# Patient Record
Sex: MALE | Race: WHITE | Employment: FULL TIME | URBAN - METROPOLITAN AREA
[De-identification: names, ages, dates, MRNs, and addresses within clinical notes are randomized per-mention and may not be internally consistent; named-entity substitution may affect disease eponyms.]

---

## 2017-01-06 ENCOUNTER — HOSPITAL ENCOUNTER (OUTPATIENT)
Dept: NUCLEAR MEDICINE | Facility: HOSPITAL | Age: 32
Discharge: HOME | End: 2017-01-06
Attending: INTERNAL MEDICINE
Payer: COMMERCIAL

## 2017-01-06 DIAGNOSIS — R12 HEARTBURN: ICD-10-CM

## 2017-01-06 DIAGNOSIS — R10.11 RUQ ABDOMINAL PAIN: ICD-10-CM

## 2017-01-06 PROCEDURE — 78264 GASTRIC EMPTYING IMG STUDY: CPT

## 2017-01-09 ENCOUNTER — HOSPITAL ENCOUNTER (OUTPATIENT)
Age: 32
Discharge: HOME | End: 2017-01-09
Attending: FAMILY MEDICINE
Payer: COMMERCIAL

## 2017-01-09 VITALS
WEIGHT: 140 LBS | SYSTOLIC BLOOD PRESSURE: 128 MMHG | DIASTOLIC BLOOD PRESSURE: 87 MMHG | RESPIRATION RATE: 18 BRPM | HEART RATE: 87 BPM | TEMPERATURE: 98 F | BODY MASS INDEX: 19.6 KG/M2 | HEIGHT: 71 IN | OXYGEN SATURATION: 98 %

## 2017-01-09 DIAGNOSIS — S39.012A LUMBAR STRAIN, INITIAL ENCOUNTER: Primary | ICD-10-CM

## 2017-01-09 LAB
POCT BILIRUBIN URINE: NEGATIVE
POCT BLOOD URINE: NEGATIVE
POCT GLUCOSE URINE: NEGATIVE MG/DL
POCT KETONE URINE: NEGATIVE MG/DL
POCT LEUKOCYTE ESTERASE URINE: NEGATIVE
POCT NITRITE URINE: NEGATIVE
POCT PH URINE: 6 (ref 5–8)
POCT PROTEIN URINE: NEGATIVE MG/DL
POCT SPECIFIC GRAVITY URINE: 1.02
POCT URINE CLARITY: CLEAR
POCT URINE COLOR: YELLOW
POCT UROBILINOGEN URINE: 0.2 MG/DL

## 2017-01-09 PROCEDURE — 99204 OFFICE O/P NEW MOD 45 MIN: CPT

## 2017-01-09 PROCEDURE — 99214 OFFICE O/P EST MOD 30 MIN: CPT

## 2017-01-09 PROCEDURE — 96372 THER/PROPH/DIAG INJ SC/IM: CPT

## 2017-01-09 PROCEDURE — 81002 URINALYSIS NONAUTO W/O SCOPE: CPT | Performed by: FAMILY MEDICINE

## 2017-01-09 RX ORDER — KETOROLAC TROMETHAMINE 30 MG/ML
60 INJECTION, SOLUTION INTRAMUSCULAR; INTRAVENOUS ONCE
Status: COMPLETED | OUTPATIENT
Start: 2017-01-09 | End: 2017-01-09

## 2017-01-09 RX ORDER — DICLOFENAC SODIUM 75 MG/1
75 TABLET, DELAYED RELEASE ORAL 2 TIMES DAILY
Qty: 20 TABLET | Refills: 0 | Status: SHIPPED | OUTPATIENT
Start: 2017-01-09 | End: 2017-03-17 | Stop reason: ALTCHOICE

## 2017-01-09 RX ORDER — CYCLOBENZAPRINE HCL 10 MG
10 TABLET ORAL NIGHTLY PRN
Qty: 10 TABLET | Refills: 0 | Status: SHIPPED | OUTPATIENT
Start: 2017-01-09 | End: 2017-01-19

## 2017-01-09 NOTE — ED PROVIDER NOTES
Patient Seen in: [facility]    History   Patient presents with:  Back Pain    Stated Complaint: BACK PAIN    HPI    This 29-year-old male presents the office with low back pain last 3 days.   He states it started after he was hel complaint: BACK PAIN  Other systems are as noted in HPI. Constitutional and vital signs reviewed. All other systems reviewed and negative except as noted above. PSFH elements reviewed from today and agreed except as otherwise stated in HPI.     Phy pain.    Symptomatic treatment for back pain as discussed. Handouts for stretching exercises are given. Prescriptions for Voltaren 75 mg twice daily and Flexeril 10 mg at bedtime are given. Usage and side effects are reviewed.   The patient is instructed comfort. Ice through clothing or a towel to avoid frostbite. Do not sleep with a heating pad as this will make the spasm worse. Do not use topical adhesive heat patches as this will make the spasm worse. Change positions frequently throughout the day.   Av

## 2017-01-09 NOTE — ED INITIAL ASSESSMENT (HPI)
Pt c/o flare-up of low back pain starting Friday night. Started after taking down Melissa decorations. States has had back pain \"for a long time\". 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   Had CT 1 year ago which showed \"slipped discs in lumbar a

## 2017-01-16 ENCOUNTER — OFFICE VISIT (OUTPATIENT)
Dept: FAMILY MEDICINE CLINIC | Facility: CLINIC | Age: 32
End: 2017-01-16

## 2017-01-16 VITALS
HEIGHT: 71 IN | TEMPERATURE: 98 F | BODY MASS INDEX: 20.3 KG/M2 | SYSTOLIC BLOOD PRESSURE: 128 MMHG | DIASTOLIC BLOOD PRESSURE: 76 MMHG | WEIGHT: 145 LBS | RESPIRATION RATE: 16 BRPM | HEART RATE: 80 BPM

## 2017-01-16 DIAGNOSIS — M54.42 ACUTE MIDLINE LOW BACK PAIN WITH LEFT-SIDED SCIATICA: Primary | ICD-10-CM

## 2017-01-16 PROCEDURE — 99213 OFFICE O/P EST LOW 20 MIN: CPT | Performed by: FAMILY MEDICINE

## 2017-01-16 NOTE — PROGRESS NOTES
Patient presents with:  Urgent Care F/u: Pt went to  1 week ago for low back pain. Pt is still having pain. Pain level 6-7/10. HPI:   Adrienne Galicia is a 33year old male who presents to the office for back issues.   Has long standing back issues, typi

## 2017-01-23 ENCOUNTER — APPOINTMENT (OUTPATIENT)
Dept: PHYSICAL THERAPY | Facility: HOSPITAL | Age: 32
End: 2017-01-23
Attending: FAMILY MEDICINE
Payer: COMMERCIAL

## 2017-01-25 ENCOUNTER — HOSPITAL ENCOUNTER (OUTPATIENT)
Dept: PHYSICAL THERAPY | Facility: HOSPITAL | Age: 32
Setting detail: THERAPIES SERIES
Discharge: HOME | End: 2017-01-25
Attending: FAMILY MEDICINE
Payer: COMMERCIAL

## 2017-01-25 DIAGNOSIS — M54.42 ACUTE MIDLINE LOW BACK PAIN WITH LEFT-SIDED SCIATICA: Primary | ICD-10-CM

## 2017-01-25 PROCEDURE — 97162 PT EVAL MOD COMPLEX 30 MIN: CPT

## 2017-01-25 PROCEDURE — 97110 THERAPEUTIC EXERCISES: CPT

## 2017-01-25 NOTE — PROGRESS NOTES
SPINE EVALUATION:   Referring Physician: Dr. Dayanara Nevarez  Diagnosis: Acute low back pain with left-sided sciatica.      Date of Service: 1/25/2017     PATIENT SUMMARY   Manoj Barrera is a 33year old y/o male who presents to therapy today with complaints of light touch. THORACOLUMBAR ROM:   Flexion: 65 with pain. Extension: 10 with stretching. Thoracic Rotation in sitting: R 40; L 40    Accessory motion: hypomobile T-L PA gliding.   Palpation: tenderness T-L paraspinal muscles, left > right piriformis m actively participate in planning and for this course of care. Thank you for your referral.   Please co-sign this note.    If you have any questions, please contact me at Dept: [phone]    Sincerely,  Electronically signed by therapist: Ambreen Kern

## 2017-01-30 ENCOUNTER — HOSPITAL ENCOUNTER (OUTPATIENT)
Dept: PHYSICAL THERAPY | Facility: HOSPITAL | Age: 32
Setting detail: THERAPIES SERIES
Discharge: HOME | End: 2017-01-30
Attending: FAMILY MEDICINE
Payer: COMMERCIAL

## 2017-01-30 PROCEDURE — 97110 THERAPEUTIC EXERCISES: CPT

## 2017-01-30 PROCEDURE — 97140 MANUAL THERAPY 1/> REGIONS: CPT

## 2017-01-30 NOTE — PROGRESS NOTES
Dx:  Acute low back pain with left sciatica. Authorized # of Visits:  8         Next MD visit: none scheduled  Fall Risk: standard         Precautions: n/a             Subjective: Patient rates his low back pain 0/10 currently.     Notes some stiffn

## 2017-02-03 ENCOUNTER — HOSPITAL ENCOUNTER (OUTPATIENT)
Dept: PHYSICAL THERAPY | Facility: HOSPITAL | Age: 32
Setting detail: THERAPIES SERIES
Discharge: HOME | End: 2017-02-03
Attending: FAMILY MEDICINE
Payer: COMMERCIAL

## 2017-02-03 PROCEDURE — 97110 THERAPEUTIC EXERCISES: CPT

## 2017-02-03 PROCEDURE — 97140 MANUAL THERAPY 1/> REGIONS: CPT

## 2017-02-03 NOTE — PROGRESS NOTES
Dx:  Acute low back pain with left sciatica. Authorized # of Visits:  8         Next MD visit: none scheduled  Fall Risk: standard         Precautions: n/a             Subjective: Patient rates his low back pain 0/10 currently.     Notes reasonable rotation FM 3# x 15 l/r. .                 Skilled Services: education, manual therapy prn. Charges: ex2,mt1.        Total Timed Treatment: 45 min  Total Treatment Time: 45 min

## 2017-02-06 ENCOUNTER — HOSPITAL ENCOUNTER (OUTPATIENT)
Dept: PHYSICAL THERAPY | Facility: HOSPITAL | Age: 32
Setting detail: THERAPIES SERIES
Discharge: HOME | End: 2017-02-06
Attending: FAMILY MEDICINE
Payer: COMMERCIAL

## 2017-02-06 PROCEDURE — 97110 THERAPEUTIC EXERCISES: CPT

## 2017-02-06 PROCEDURE — 97140 MANUAL THERAPY 1/> REGIONS: CPT

## 2017-02-06 NOTE — PROGRESS NOTES
Dx:  Acute low back pain with left sciatica. Authorized # of Visits:  8         Next MD visit: none scheduled  Fall Risk: standard         Precautions: n/a             Subjective: Patient rates his low back pain 0/10 currently.     Notes reasonable area prone by PT. stm t-l-s area prone by PT. 1/2 kneeling chops FM 3# x 15 l/r. Sciatic nerve gliding sliders x 10 l/r. Burlene Birmingham X 15 l/r. Burlene Birmingham X 15 l/r. Burlene Birmingham Side lying clam shells x 15 l/r. Burlene Birmingham X 15 l/r with red band around knees.  Green  Band around knees x 1

## 2017-02-10 ENCOUNTER — HOSPITAL ENCOUNTER (OUTPATIENT)
Dept: PHYSICAL THERAPY | Facility: HOSPITAL | Age: 32
Setting detail: THERAPIES SERIES
Discharge: HOME | End: 2017-02-10
Attending: FAMILY MEDICINE
Payer: COMMERCIAL

## 2017-02-10 PROCEDURE — 97110 THERAPEUTIC EXERCISES: CPT

## 2017-02-10 PROCEDURE — 97140 MANUAL THERAPY 1/> REGIONS: CPT

## 2017-02-10 NOTE — PROGRESS NOTES
Dx:  Acute low back pain with left sciatica. Authorized # of Visits:  8         Next MD visit: none scheduled  Fall Risk: standard         Precautions: n/a             Subjective: Patient rates his low back pain 0/10 currently and yesterday.     Has stretch 1/2 kneeling 20 seconds x 3 l/r. . 30 seconds each leg. Prone thoracolumbar PA mobs grade 3. X. Bridging x 10 reps. . X 15 without SB.      stm t-l-s area prone by PT. stm t-l-s area prone by PT. 1/2 kneeling chops FM 3# x 15 l/r.   FM SR and alt

## 2017-02-13 ENCOUNTER — HOSPITAL ENCOUNTER (OUTPATIENT)
Dept: PHYSICAL THERAPY | Facility: HOSPITAL | Age: 32
Setting detail: THERAPIES SERIES
Discharge: HOME | End: 2017-02-13
Attending: FAMILY MEDICINE
Payer: COMMERCIAL

## 2017-02-13 PROCEDURE — 97140 MANUAL THERAPY 1/> REGIONS: CPT

## 2017-02-13 PROCEDURE — 97110 THERAPEUTIC EXERCISES: CPT

## 2017-02-13 NOTE — PROGRESS NOTES
Dx:  Acute low back pain with left sciatica.          Authorized # of Visits:  8         Next MD visit: none scheduled  Fall Risk: standard         Precautions: n/a             Subjective: Patient rates his centralized,low back dull, aching pain 3/10 gina seconds l/r. Arch Teodora X 45 seconds l/r. Arch Teodora X 45 seconds l/r. . X 30 secs l/r. X 30 seconds each leg. 4 point rocking x12. X 12  X 12. X 15. X 10 with cueing. 4 point cat-camel x 10 each with cueing. X 12  X 12. X 10. X 10 reps.        1/2 kneeling and

## 2017-02-17 ENCOUNTER — HOSPITAL ENCOUNTER (OUTPATIENT)
Dept: PHYSICAL THERAPY | Facility: HOSPITAL | Age: 32
Setting detail: THERAPIES SERIES
Discharge: HOME | End: 2017-02-17
Attending: FAMILY MEDICINE
Payer: COMMERCIAL

## 2017-02-17 PROCEDURE — 97110 THERAPEUTIC EXERCISES: CPT

## 2017-02-17 PROCEDURE — 97140 MANUAL THERAPY 1/> REGIONS: CPT

## 2017-02-17 NOTE — PROGRESS NOTES
Dx:  Acute low back pain with left sciatica.          Authorized # of Visits:  8         Next MD visit: none scheduled  Fall Risk: standard         Precautions: n/a             Subjective: Patient rates his centralized,low back dull, aching pain 0/10 this m bridging. X 20 each movement. X 20 each   X 20 each. X 20 each. X 20 each. Piriformis stretch with SB assist x 45 seconds l/r. Loreto Meaw X 45 seconds l/r. Loreto Dukew X 45 seconds l/r. . X 30 secs l/r. X 30 seconds each leg. X 45 seconds each leg.     4 point rocking x12

## 2017-02-24 ENCOUNTER — HOSPITAL ENCOUNTER (OUTPATIENT)
Dept: PHYSICAL THERAPY | Facility: HOSPITAL | Age: 32
Setting detail: THERAPIES SERIES
Discharge: HOME | End: 2017-02-24
Attending: FAMILY MEDICINE
Payer: COMMERCIAL

## 2017-02-24 PROCEDURE — 97110 THERAPEUTIC EXERCISES: CPT

## 2017-02-24 NOTE — PROGRESS NOTES
Dx:  Acute low back pain with left sciatica. Authorized # of Visits:  8         Next MD visit: none scheduled  Fall Risk: standard         Precautions: n/a            Discharge Summary    Pt has attended 8 visits in Physical Therapy.      Subjective signed by therapist: Isaac Ortiz        Date: 1/30/2017  Tx#: 2/8 Date: 2/3/17  Tx#: 3/8 Date: 2/6/17  Tx#: 4/8 Date: 2/10/17  Tx#: 5/8 Date: 2/13/17  Tx#: 6/8 Date: 2/17/17  Tx#: 7/8 Date: 2/24/27  Tx#: 8/8   NuStep (10/10) load 5 for 6 minutes.  LOAD 6 around knees x 10 each leg. Lateral walking with green band around ankles x 4 laps. X 4 laps. Abdifatah Kal Spri band x 6 laps. . HEP. Shuttle DLS 26# x 20 and 742# x 10. # x 30. 962# DLS x 20 reps. . 113# DLS 2 x 20.        6\" LSU x 15 each leg.  X 15

## 2017-03-17 ENCOUNTER — OFFICE VISIT (OUTPATIENT)
Dept: FAMILY MEDICINE CLINIC | Facility: CLINIC | Age: 32
End: 2017-03-17

## 2017-03-17 VITALS
WEIGHT: 141.38 LBS | RESPIRATION RATE: 16 BRPM | SYSTOLIC BLOOD PRESSURE: 120 MMHG | TEMPERATURE: 99 F | HEIGHT: 71 IN | DIASTOLIC BLOOD PRESSURE: 80 MMHG | HEART RATE: 88 BPM | BODY MASS INDEX: 19.79 KG/M2

## 2017-03-17 DIAGNOSIS — Z00.00 ANNUAL PHYSICAL EXAM: Primary | ICD-10-CM

## 2017-03-17 DIAGNOSIS — F41.1 ANXIETY STATE: ICD-10-CM

## 2017-03-17 PROCEDURE — 99395 PREV VISIT EST AGE 18-39: CPT | Performed by: FAMILY MEDICINE

## 2017-03-17 RX ORDER — SERTRALINE HYDROCHLORIDE 25 MG/1
25 TABLET, FILM COATED ORAL DAILY
Qty: 90 TABLET | Refills: 1 | Status: SHIPPED | OUTPATIENT
Start: 2017-03-17 | End: 2021-01-14

## 2017-03-17 NOTE — PROGRESS NOTES
Patient presents with:  Physical     HPI:   Siva Notice is a 33year old male who presents for a complete physical exam. Seen a few months ago for back issues. This is better now. PT has helped. Stomach issue persists. No changes.   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  .   : no. Children: effect, do not suddenly stop the medication, there is a withdrawal.  Pt to call wt update in about 2-3 months.      Beba Alvarado M.D.   [facility] 3  03/17/2017

## 2017-06-27 ENCOUNTER — OFFICE VISIT (OUTPATIENT)
Dept: FAMILY MEDICINE CLINIC | Facility: CLINIC | Age: 32
End: 2017-06-27

## 2017-06-27 VITALS
HEART RATE: 84 BPM | TEMPERATURE: 98 F | SYSTOLIC BLOOD PRESSURE: 106 MMHG | WEIGHT: 147 LBS | BODY MASS INDEX: 20.58 KG/M2 | RESPIRATION RATE: 16 BRPM | HEIGHT: 71 IN | DIASTOLIC BLOOD PRESSURE: 60 MMHG

## 2017-06-27 DIAGNOSIS — J30.2 SEASONAL ALLERGIC RHINITIS, UNSPECIFIED ALLERGIC RHINITIS TRIGGER: Primary | ICD-10-CM

## 2017-06-27 PROCEDURE — 99213 OFFICE O/P EST LOW 20 MIN: CPT | Performed by: FAMILY MEDICINE

## 2017-06-27 RX ORDER — LEVOCETIRIZINE DIHYDROCHLORIDE 5 MG/1
5 TABLET, FILM COATED ORAL EVERY EVENING
Qty: 30 TABLET | Refills: 2 | Status: SHIPPED | OUTPATIENT
Start: 2017-06-27 | End: 2021-01-14

## 2017-06-27 RX ORDER — FLUTICASONE PROPIONATE 50 MCG
2 SPRAY, SUSPENSION (ML) NASAL DAILY
Qty: 1 BOTTLE | Refills: 3 | Status: SHIPPED | OUTPATIENT
Start: 2017-06-27 | End: 2018-06-22

## 2017-06-27 NOTE — PROGRESS NOTES
Patient presents with:  Cough: x2 months w/ productive cough, chest congestion and some wheezing. OTC med Zyrtec and other w/ no relief      HPI:   Ismael Hoang is a 33year old male who presents to the office for cough, congestion.   His allergies are wo Take 1 tablet (5 mg total) by mouth every evening. Dispense: 30 tablet;  Refill: 2      Kalpana Rodriguez M.D.   EM[facility] 3  06/27/17

## 2017-07-17 ENCOUNTER — OFFICE VISIT (OUTPATIENT)
Dept: FAMILY MEDICINE CLINIC | Facility: CLINIC | Age: 32
End: 2017-07-17

## 2017-07-17 VITALS
DIASTOLIC BLOOD PRESSURE: 68 MMHG | WEIGHT: 145 LBS | TEMPERATURE: 98 F | SYSTOLIC BLOOD PRESSURE: 110 MMHG | HEART RATE: 82 BPM | BODY MASS INDEX: 20 KG/M2 | RESPIRATION RATE: 16 BRPM

## 2017-07-17 DIAGNOSIS — J34.89 THICK NASAL MUCUS: Primary | ICD-10-CM

## 2017-07-17 DIAGNOSIS — J30.9 ACUTE ALLERGIC RHINITIS, UNSPECIFIED SEASONALITY, UNSPECIFIED TRIGGER: ICD-10-CM

## 2017-07-17 PROCEDURE — 99213 OFFICE O/P EST LOW 20 MIN: CPT | Performed by: FAMILY MEDICINE

## 2017-07-17 NOTE — PROGRESS NOTES
Patient presents with:  Cough: Pt states still having productive cough. HPI:   Carroll Carlisle is a 33year old male who presents to the office for eval of cough. Seen here for this 6/27/17. The mucus is still in the throat, irritating.    Taking Flon

## 2021-01-14 ENCOUNTER — HOSPITAL ENCOUNTER (OUTPATIENT)
Dept: GENERAL RADIOLOGY | Age: 36
Discharge: HOME | End: 2021-01-14
Attending: PHYSICIAN ASSISTANT
Payer: COMMERCIAL

## 2021-01-14 ENCOUNTER — OFFICE VISIT (OUTPATIENT)
Dept: FAMILY MEDICINE CLINIC | Facility: CLINIC | Age: 36
End: 2021-01-14
Payer: COMMERCIAL

## 2021-01-14 ENCOUNTER — TELEPHONE (OUTPATIENT)
Dept: FAMILY MEDICINE CLINIC | Facility: CLINIC | Age: 36
End: 2021-01-14

## 2021-01-14 VITALS
WEIGHT: 154.63 LBS | RESPIRATION RATE: 16 BRPM | BODY MASS INDEX: 22.14 KG/M2 | HEART RATE: 80 BPM | SYSTOLIC BLOOD PRESSURE: 134 MMHG | DIASTOLIC BLOOD PRESSURE: 84 MMHG | TEMPERATURE: 98 F | HEIGHT: 70.25 IN

## 2021-01-14 DIAGNOSIS — M79.671 RIGHT FOOT PAIN: ICD-10-CM

## 2021-01-14 DIAGNOSIS — M79.671 RIGHT FOOT PAIN: Primary | ICD-10-CM

## 2021-01-14 PROCEDURE — 73630 X-RAY EXAM OF FOOT: CPT | Performed by: PHYSICIAN ASSISTANT

## 2021-01-14 PROCEDURE — 99203 OFFICE O/P NEW LOW 30 MIN: CPT | Performed by: PHYSICIAN ASSISTANT

## 2021-01-14 PROCEDURE — 3079F DIAST BP 80-89 MM HG: CPT | Performed by: PHYSICIAN ASSISTANT

## 2021-01-14 PROCEDURE — 3075F SYST BP GE 130 - 139MM HG: CPT | Performed by: PHYSICIAN ASSISTANT

## 2021-01-14 PROCEDURE — 3008F BODY MASS INDEX DOCD: CPT | Performed by: PHYSICIAN ASSISTANT

## 2021-01-14 RX ORDER — NAPROXEN 500 MG/1
500 TABLET ORAL 2 TIMES DAILY WITH MEALS
Qty: 20 TABLET | Refills: 0 | Status: SHIPPED | OUTPATIENT
Start: 2021-01-14 | End: 2021-01-24

## 2021-01-14 NOTE — PROGRESS NOTES
Patient presents with: Foot Pain: Pain in Ball of R foot for the past week with any pressure. HISTORY OF PRESENT ILLNESS  Gabe Crespo is a 38year old male who presents for evaluation of right foot pain.  He started to notice significant pain in t encounter diagnosis)  Plan: Suspect Mortons neuroma. As he is tender over the bony area, will check foot x-ray to ensure no stress fractures or other bony abnormalities. Recommend that he wear hard soled shoes. Start naproxen 500 mg twice daily x 5 days.  Carey Trotter

## 2021-01-14 NOTE — TELEPHONE ENCOUNTER
Patient was told to avoid putting direct pressure on his foot, he's looking for clarification on for how long?  Also states he needs a note for work clearing him

## 2021-05-18 ENCOUNTER — IMMUNIZATION (OUTPATIENT)
Dept: LAB | Facility: HOSPITAL | Age: 36
End: 2021-05-18
Attending: EMERGENCY MEDICINE
Payer: COMMERCIAL

## 2021-05-18 DIAGNOSIS — Z23 NEED FOR VACCINATION: Primary | ICD-10-CM

## 2021-05-18 PROCEDURE — 0001A SARSCOV2 VAC 30MCG/0.3ML IM: CPT

## 2021-06-08 ENCOUNTER — IMMUNIZATION (OUTPATIENT)
Dept: LAB | Facility: HOSPITAL | Age: 36
End: 2021-06-08
Attending: EMERGENCY MEDICINE
Payer: COMMERCIAL

## 2021-06-08 DIAGNOSIS — Z23 NEED FOR VACCINATION: Primary | ICD-10-CM

## 2021-06-08 PROCEDURE — 0002A SARSCOV2 VAC 30MCG/0.3ML IM: CPT

## 2025-06-27 ENCOUNTER — RESULTS FOLLOW-UP (OUTPATIENT)
Dept: URGENT CARE | Age: 40
End: 2025-06-27

## 2025-06-27 ENCOUNTER — WALK IN (OUTPATIENT)
Dept: URGENT CARE | Age: 40
End: 2025-06-27

## 2025-06-27 ENCOUNTER — IMAGING SERVICES (OUTPATIENT)
Dept: GENERAL RADIOLOGY | Age: 40
End: 2025-06-27
Attending: FAMILY MEDICINE

## 2025-06-27 VITALS
TEMPERATURE: 97.8 F | DIASTOLIC BLOOD PRESSURE: 94 MMHG | SYSTOLIC BLOOD PRESSURE: 142 MMHG | HEART RATE: 85 BPM | RESPIRATION RATE: 18 BRPM | OXYGEN SATURATION: 97 %

## 2025-06-27 DIAGNOSIS — M54.12 CERVICAL RADICULOPATHY: Primary | ICD-10-CM

## 2025-06-27 DIAGNOSIS — M47.892 OTHER OSTEOARTHRITIS OF SPINE, CERVICAL REGION: ICD-10-CM

## 2025-06-27 DIAGNOSIS — M25.511 ACUTE PAIN OF RIGHT SHOULDER: ICD-10-CM

## 2025-06-27 DIAGNOSIS — M54.2 NECK PAIN: ICD-10-CM

## 2025-06-27 PROCEDURE — 99204 OFFICE O/P NEW MOD 45 MIN: CPT | Performed by: FAMILY MEDICINE

## 2025-06-27 PROCEDURE — 72050 X-RAY EXAM NECK SPINE 4/5VWS: CPT | Performed by: RADIOLOGY

## 2025-06-27 RX ORDER — CARISOPRODOL 350 MG/1
350 TABLET ORAL 3 TIMES DAILY PRN
Qty: 20 TABLET | Refills: 0 | Status: SHIPPED | OUTPATIENT
Start: 2025-06-27 | End: 2025-07-04

## 2025-06-27 RX ORDER — METHYLPREDNISOLONE 4 MG
TABLET, DOSE PACK ORAL
Qty: 21 TABLET | Refills: 0 | Status: SHIPPED | OUTPATIENT
Start: 2025-06-27

## 2025-06-27 ASSESSMENT — PAIN SCALES - GENERAL: PAINLEVEL_OUTOF10: 5

## 2025-06-28 SDOH — ECONOMIC STABILITY: FOOD INSECURITY: WITHIN THE PAST 12 MONTHS, THE FOOD YOU BOUGHT JUST DIDN'T LAST AND YOU DIDN'T HAVE MONEY TO GET MORE.: NEVER TRUE

## 2025-06-28 SDOH — ECONOMIC STABILITY: HOUSING INSECURITY: WHAT IS YOUR LIVING SITUATION TODAY?: I HAVE A STEADY PLACE TO LIVE

## 2025-06-28 SDOH — ECONOMIC STABILITY: TRANSPORTATION INSECURITY
IN THE PAST 12 MONTHS, HAS LACK OF RELIABLE TRANSPORTATION KEPT YOU FROM MEDICAL APPOINTMENTS, MEETINGS, WORK OR FROM GETTING THINGS NEEDED FOR DAILY LIVING?: NO

## 2025-06-28 SDOH — ECONOMIC STABILITY: HOUSING INSECURITY: DO YOU HAVE PROBLEMS WITH ANY OF THE FOLLOWING?: NONE OF THE ABOVE

## 2025-06-28 ASSESSMENT — SOCIAL DETERMINANTS OF HEALTH (SDOH): IN THE PAST 12 MONTHS, HAS THE ELECTRIC, GAS, OIL, OR WATER COMPANY THREATENED TO SHUT OFF SERVICE IN YOUR HOME?: NO

## 2025-07-01 ENCOUNTER — APPOINTMENT (OUTPATIENT)
Dept: INTERNAL MEDICINE | Age: 40
End: 2025-07-01

## 2025-07-01 ENCOUNTER — MED INFO FORMS (OUTPATIENT)
Dept: INTERNAL MEDICINE | Age: 40
End: 2025-07-01

## 2025-07-01 VITALS
RESPIRATION RATE: 18 BRPM | SYSTOLIC BLOOD PRESSURE: 144 MMHG | TEMPERATURE: 97.6 F | HEART RATE: 87 BPM | OXYGEN SATURATION: 97 % | DIASTOLIC BLOOD PRESSURE: 90 MMHG | BODY MASS INDEX: 21.56 KG/M2 | HEIGHT: 71 IN | WEIGHT: 154 LBS

## 2025-07-01 DIAGNOSIS — M50.30 DDD (DEGENERATIVE DISC DISEASE), CERVICAL: ICD-10-CM

## 2025-07-01 DIAGNOSIS — Z56.0 NOT CURRENTLY WORKING DUE TO DISABLED STATUS: ICD-10-CM

## 2025-07-01 DIAGNOSIS — M54.12 CERVICAL RADICULAR PAIN: ICD-10-CM

## 2025-07-01 DIAGNOSIS — M54.2 MUSCULOSKELETAL NECK PAIN: Primary | ICD-10-CM

## 2025-07-01 DIAGNOSIS — M48.02 FORAMINAL STENOSIS OF CERVICAL REGION: ICD-10-CM

## 2025-07-01 SDOH — ECONOMIC STABILITY - INCOME SECURITY: UNEMPLOYMENT, UNSPECIFIED: Z56.0

## 2025-07-01 ASSESSMENT — PATIENT HEALTH QUESTIONNAIRE - PHQ9
SUM OF ALL RESPONSES TO PHQ9 QUESTIONS 1 AND 2: 0
SUM OF ALL RESPONSES TO PHQ9 QUESTIONS 1 AND 2: 0
1. LITTLE INTEREST OR PLEASURE IN DOING THINGS: NOT AT ALL
2. FEELING DOWN, DEPRESSED OR HOPELESS: NOT AT ALL
CLINICAL INTERPRETATION OF PHQ2 SCORE: NO FURTHER SCREENING NEEDED

## 2025-07-02 ENCOUNTER — OFFICE VISIT (OUTPATIENT)
Dept: PHYSICAL THERAPY | Age: 40
End: 2025-07-02
Attending: INTERNAL MEDICINE

## 2025-07-02 DIAGNOSIS — M54.12 CERVICAL RADICULAR PAIN: ICD-10-CM

## 2025-07-02 DIAGNOSIS — M48.02 FORAMINAL STENOSIS OF CERVICAL REGION: ICD-10-CM

## 2025-07-02 DIAGNOSIS — M50.30 DDD (DEGENERATIVE DISC DISEASE), CERVICAL: ICD-10-CM

## 2025-07-02 DIAGNOSIS — M54.2 MUSCULOSKELETAL NECK PAIN: Primary | ICD-10-CM

## 2025-07-02 ASSESSMENT — ENCOUNTER SYMPTOMS: PAIN FREQUENCY: INTERMITTENT

## 2025-07-03 ENCOUNTER — OFFICE VISIT (OUTPATIENT)
Dept: PHYSICAL THERAPY | Age: 40
End: 2025-07-03
Attending: INTERNAL MEDICINE

## 2025-07-03 DIAGNOSIS — M54.12 CERVICAL RADICULAR PAIN: ICD-10-CM

## 2025-07-03 DIAGNOSIS — M50.30 DDD (DEGENERATIVE DISC DISEASE), CERVICAL: ICD-10-CM

## 2025-07-03 DIAGNOSIS — M48.02 FORAMINAL STENOSIS OF CERVICAL REGION: ICD-10-CM

## 2025-07-03 DIAGNOSIS — M54.2 MUSCULOSKELETAL NECK PAIN: Primary | ICD-10-CM

## 2025-07-04 ASSESSMENT — ENCOUNTER SYMPTOMS
PAIN SEVERITY NOW: 4
QUALITY: SHARP
QUALITY: ACHE
SUBJECTIVE PAIN PROGRESSION: IMPROVED
ALLEVIATING FACTORS: AVOIDING MOVEMENT IN INVOLVED AREA
QUALITY: RADIATING

## 2025-07-09 ENCOUNTER — APPOINTMENT (OUTPATIENT)
Dept: PHYSICAL THERAPY | Age: 40
End: 2025-07-09
Attending: INTERNAL MEDICINE

## 2025-07-09 DIAGNOSIS — M50.30 DDD (DEGENERATIVE DISC DISEASE), CERVICAL: ICD-10-CM

## 2025-07-09 DIAGNOSIS — M54.12 CERVICAL RADICULAR PAIN: ICD-10-CM

## 2025-07-09 DIAGNOSIS — M54.2 MUSCULOSKELETAL NECK PAIN: Primary | ICD-10-CM

## 2025-07-09 DIAGNOSIS — M48.02 FORAMINAL STENOSIS OF CERVICAL REGION: ICD-10-CM

## 2025-07-11 ENCOUNTER — E-ADVICE (OUTPATIENT)
Dept: HEALTH INFORMATION MANAGEMENT | Facility: OTHER | Age: 40
End: 2025-07-11

## 2025-07-11 ENCOUNTER — APPOINTMENT (OUTPATIENT)
Dept: PHYSICAL THERAPY | Age: 40
End: 2025-07-11
Attending: INTERNAL MEDICINE

## 2025-07-11 DIAGNOSIS — M48.02 FORAMINAL STENOSIS OF CERVICAL REGION: ICD-10-CM

## 2025-07-11 DIAGNOSIS — M54.12 CERVICAL RADICULAR PAIN: ICD-10-CM

## 2025-07-11 DIAGNOSIS — M54.2 MUSCULOSKELETAL NECK PAIN: Primary | ICD-10-CM

## 2025-07-11 DIAGNOSIS — M50.30 DDD (DEGENERATIVE DISC DISEASE), CERVICAL: ICD-10-CM

## 2025-07-15 ENCOUNTER — APPOINTMENT (OUTPATIENT)
Dept: PHYSICAL THERAPY | Age: 40
End: 2025-07-15
Attending: INTERNAL MEDICINE

## 2025-07-15 DIAGNOSIS — M54.12 CERVICAL RADICULAR PAIN: ICD-10-CM

## 2025-07-15 DIAGNOSIS — M48.02 FORAMINAL STENOSIS OF CERVICAL REGION: ICD-10-CM

## 2025-07-15 DIAGNOSIS — M50.30 DDD (DEGENERATIVE DISC DISEASE), CERVICAL: ICD-10-CM

## 2025-07-15 DIAGNOSIS — M54.2 MUSCULOSKELETAL NECK PAIN: Primary | ICD-10-CM

## 2025-07-17 ENCOUNTER — APPOINTMENT (OUTPATIENT)
Dept: PHYSICAL THERAPY | Age: 40
End: 2025-07-17
Attending: INTERNAL MEDICINE

## 2025-07-17 DIAGNOSIS — M48.02 FORAMINAL STENOSIS OF CERVICAL REGION: ICD-10-CM

## 2025-07-17 DIAGNOSIS — M54.12 CERVICAL RADICULAR PAIN: ICD-10-CM

## 2025-07-17 DIAGNOSIS — M50.30 DDD (DEGENERATIVE DISC DISEASE), CERVICAL: ICD-10-CM

## 2025-07-17 DIAGNOSIS — M54.2 MUSCULOSKELETAL NECK PAIN: Primary | ICD-10-CM

## 2025-07-18 ENCOUNTER — TELEPHONE (OUTPATIENT)
Dept: INTERNAL MEDICINE | Age: 40
End: 2025-07-18

## 2025-07-18 ENCOUNTER — E-ADVICE (OUTPATIENT)
Dept: INTERNAL MEDICINE | Age: 40
End: 2025-07-18

## 2025-07-19 ASSESSMENT — ENCOUNTER SYMPTOMS: PAIN SEVERITY NOW: 6

## (undated) NOTE — MR AVS SNAPSHOT
[address] [facility] [address]  [facility],  [address]  [address] [place] [address] [phone]               Thank you for choosing us for your health care visit with Tammi Palmer MD.  We are glad to serve you and happy to provide you with this s Call [phone] for help. [employer] is NOT to be used for urgent needs. For medical emergencies, dial 911.            Visit [facility] online at  [email or web address]

## (undated) NOTE — ED AVS SNAPSHOT
[facility] in [address] [place] [address] Drive,[address]    [address] [place] [address]    Phone:  [phone]    Fax:  [facility]   MRN: [id number]    Department:  [facility] END   Date of Visit:  1/9/2017 Phone:  [phone]    - Cyclobenzaprine HCl 10 MG Tabs  - Diclofenac Sodium 75 MG Tbec              Discharge Instructions       Start the Voltaren with dinner tonight since you received a Toradol shot while you are in the office.   Take the Voltaren wi at [phone]. Si usted tiene algun problema con jansen sequimiento, por favor llame a nuestro adminstrador de casos al [phone].     Expect to receive an electronic request (by e-mail or text) to complete a self-assessment the day after your visi Rupali Thakkar [facility] [address] ([place]) [address] [facility] [address]  ([address]) [address] [facility]. ([address]) [address] [address] [address] ([address] office, you can view your past visit information in [employer] by going to Visits < Visit Summaries. [employer] questions? Call [phone] for help. [employer] is NOT to be used for urgent needs. For medical emergencies, dial 911.

## (undated) NOTE — Clinical Note
Date: 1/16/2017    Patient Name: Marcela Alva          To Whom it may concern: This letter has been written at the patient's request. The above patient was seen at the [facility] for treatment of a medical condition.     This patient osmanu

## (undated) NOTE — LETTER
Date: 1/15/2021    Patient Name: Neyda Lo          To Whom it may concern: This letter has been written at the patient's request. The above patient was seen at the [facility] for treatment of a medical condition.     This patient shadi

## (undated) NOTE — LETTER
[facility] IN [place]  [address] [address]  Dept: [phone]  Dept Fax: [phone]         January 9, 2017    Patient: Red Sin   YOB: 1985   Date of Visit: 1/9/2017       To Whom It May Concern: